# Patient Record
Sex: MALE | Race: BLACK OR AFRICAN AMERICAN
[De-identification: names, ages, dates, MRNs, and addresses within clinical notes are randomized per-mention and may not be internally consistent; named-entity substitution may affect disease eponyms.]

---

## 2020-06-06 ENCOUNTER — HOSPITAL ENCOUNTER (EMERGENCY)
Dept: HOSPITAL 96 - M.ERS | Age: 45
Discharge: HOME | End: 2020-06-06
Payer: COMMERCIAL

## 2020-06-06 VITALS — DIASTOLIC BLOOD PRESSURE: 106 MMHG | SYSTOLIC BLOOD PRESSURE: 142 MMHG

## 2020-06-06 VITALS — WEIGHT: 205.01 LBS | BODY MASS INDEX: 23.72 KG/M2 | HEIGHT: 78 IN

## 2020-06-06 DIAGNOSIS — N34.2: Primary | ICD-10-CM

## 2020-06-06 DIAGNOSIS — N30.00: ICD-10-CM

## 2020-06-06 LAB
BACTERIA-REFLEX: (no result) /HPF
BILIRUB UR-MCNC: NEGATIVE MG/DL
COLOR UR: YELLOW
KETONES UR STRIP-MCNC: (no result) MG/DL
MUCUS: (no result) STRN/LPF
PROT UR QL STRIP: NEGATIVE
RBC # UR STRIP: NEGATIVE /UL
RBC #/AREA URNS HPF: (no result) /HPF (ref 0–2)
SP GR UR STRIP: 1.02 (ref 1–1.03)
SQUAMOUS: (no result) /LPF (ref 0–3)
URINE CLARITY: CLEAR
URINE GLUCOSE-RANDOM: NEGATIVE
URINE LEUKOCYTES-REFLEX: (no result)
URINE NITRITE-REFLEX: NEGATIVE
UROBILINOGEN UR STRIP-ACNC: 0.2 E.U./DL (ref 0.2–1)

## 2020-07-01 ENCOUNTER — HOSPITAL ENCOUNTER (EMERGENCY)
Dept: HOSPITAL 96 - M.ERS | Age: 45
Discharge: HOME | End: 2020-07-01
Payer: COMMERCIAL

## 2020-07-01 VITALS — DIASTOLIC BLOOD PRESSURE: 80 MMHG | SYSTOLIC BLOOD PRESSURE: 139 MMHG

## 2020-07-01 VITALS — WEIGHT: 210.01 LBS | HEIGHT: 78 IN | BODY MASS INDEX: 24.3 KG/M2

## 2020-07-01 DIAGNOSIS — Z79.899: ICD-10-CM

## 2020-07-01 DIAGNOSIS — N20.0: Primary | ICD-10-CM

## 2020-07-01 LAB
ABSOLUTE BASOPHILS: 0 THOU/UL (ref 0–0.2)
ABSOLUTE EOSINOPHILS: 0.1 THOU/UL (ref 0–0.7)
ABSOLUTE MONOCYTES: 0.5 THOU/UL (ref 0–1.2)
ANION GAP SERPL CALC-SCNC: 1 MMOL/L (ref 7–16)
BASOPHILS NFR BLD AUTO: 0.8 %
BILIRUB UR-MCNC: NEGATIVE MG/DL
BUN SERPL-MCNC: 17 MG/DL (ref 7–18)
CALCIUM SERPL-MCNC: 8.9 MG/DL (ref 8.5–10.1)
CHLORIDE SERPL-SCNC: 100 MMOL/L (ref 98–107)
CO2 SERPL-SCNC: 31 MMOL/L (ref 21–32)
COLOR UR: YELLOW
CREAT SERPL-MCNC: 1.1 MG/DL (ref 0.6–1.3)
EOSINOPHIL NFR BLD: 1.1 %
GLUCOSE SERPL-MCNC: 91 MG/DL (ref 70–99)
GRANULOCYTES NFR BLD MANUAL: 55.2 %
HCT VFR BLD CALC: 40.9 % (ref 42–52)
HGB BLD-MCNC: 13.6 GM/DL (ref 14–18)
KETONES UR STRIP-MCNC: (no result) MG/DL
LYMPHOCYTES # BLD: 1.7 THOU/UL (ref 0.8–5.3)
LYMPHOCYTES NFR BLD AUTO: 32.4 %
MCH RBC QN AUTO: 28.2 PG (ref 26–34)
MCHC RBC AUTO-ENTMCNC: 33.3 G/DL (ref 28–37)
MCV RBC: 84.7 FL (ref 80–100)
MONOCYTES NFR BLD: 10.5 %
MPV: 10 FL. (ref 7.2–11.1)
NEUTROPHILS # BLD: 2.8 THOU/UL (ref 1.6–8.1)
NUCLEATED RBCS: 0 /100WBC
PLATELET COUNT*: 188 THOU/UL (ref 150–400)
POTASSIUM SERPL-SCNC: 4 MMOL/L (ref 3.5–5.1)
PROT UR QL STRIP: NEGATIVE
RBC # BLD AUTO: 4.83 MIL/UL (ref 4.5–6)
RBC # UR STRIP: NEGATIVE /UL
RDW-CV: 13.7 % (ref 10.5–14.5)
SODIUM SERPL-SCNC: 132 MMOL/L (ref 136–145)
SP GR UR STRIP: >= 1.03 (ref 1–1.03)
URINE CLARITY: CLEAR
URINE GLUCOSE-RANDOM: NEGATIVE
URINE LEUKOCYTES-REFLEX: NEGATIVE
URINE NITRITE-REFLEX: NEGATIVE
UROBILINOGEN UR STRIP-ACNC: 0.2 E.U./DL (ref 0.2–1)
WBC # BLD AUTO: 5.1 THOU/UL (ref 4–11)

## 2022-01-28 ENCOUNTER — HOSPITAL ENCOUNTER (EMERGENCY)
Dept: HOSPITAL 96 - M.ERS | Age: 47
Discharge: HOME | End: 2022-01-28
Payer: COMMERCIAL

## 2022-01-28 VITALS — DIASTOLIC BLOOD PRESSURE: 82 MMHG | SYSTOLIC BLOOD PRESSURE: 140 MMHG

## 2022-01-28 VITALS — HEIGHT: 78 IN | BODY MASS INDEX: 28.35 KG/M2 | WEIGHT: 245 LBS

## 2022-01-28 DIAGNOSIS — J44.9: ICD-10-CM

## 2022-01-28 DIAGNOSIS — U07.1: Primary | ICD-10-CM

## 2022-01-28 LAB
ANION GAP SERPL CALC-SCNC: 6 MMOL/L (ref 7–16)
BUN SERPL-MCNC: 14 MG/DL (ref 7–18)
CALCIUM SERPL-MCNC: 8.8 MG/DL (ref 8.5–10.1)
CHLORIDE SERPL-SCNC: 107 MMOL/L (ref 98–107)
CO2 SERPL-SCNC: 31 MMOL/L (ref 21–32)
CREAT SERPL-MCNC: 1.2 MG/DL (ref 0.6–1.3)
GLUCOSE SERPL-MCNC: 88 MG/DL (ref 70–99)
HCT VFR BLD CALC: 42.3 % (ref 42–52)
HGB BLD-MCNC: 13.7 GM/DL (ref 14–18)
MCH RBC QN AUTO: 27.1 PG (ref 26–34)
MCHC RBC AUTO-ENTMCNC: 32.3 G/DL (ref 28–37)
MCV RBC: 83.9 FL (ref 80–100)
MPV: 9.1 FL. (ref 7.2–11.1)
PLATELET COUNT*: 220 THOU/UL (ref 150–400)
POTASSIUM SERPL-SCNC: 4 MMOL/L (ref 3.5–5.1)
RBC # BLD AUTO: 5.04 MIL/UL (ref 4.5–6)
RDW-CV: 14.1 % (ref 10.5–14.5)
SODIUM SERPL-SCNC: 144 MMOL/L (ref 136–145)
WBC # BLD AUTO: 4.2 THOU/UL (ref 4–11)

## 2022-01-30 NOTE — EKG
[place] [address]
Phone:  [phone]                     ELECTROCARDIOGRAM REPORT      
_______________________________________________________________________________
 
Name:         CHRISTIAN WOODRUFF           Room:                     [facility]#:    [id number]     Account #:     [id number]  
Admission:    22    Attend Phys:                     
Discharge:    22    Date of Birth: [birth year]  
Date of Service: 22 183  Report #:      [id number]
        [id number]
_______________________________________________________________________________
THIS REPORT FOR:  //name//                      
 
                         [facility] ED
                                       
Test Date:    2022               Test Time:    18:39:00
Pat Name:     CHRISTIAN WOODRUFF        Department:   
Patient ID:   [id number]            Room:          
Gender:                               Technician:   MTRMAREK
:          1975               Requested By: Violeta Trotter
Order Number: [id number] MD:   Stone Potts
                                 Measurements
Intervals                              Axis          
Rate:         66                       P:            71
AK:           188                      QRS:          70
QRSD:         112                      T:            58
QT:           400                                    
QTc:          420                                    
                           Interpretive Statements
Sinus rhythm
Borderline intraventricular conduction delay
RSR' in V1 or V2, probably normal variant
No previous ECG available for comparison
Electronically Signed On 2022 13:12:42 CST by Stone Potts
[email or web address]
 
 
 
 
 
 
 
 
 
 
 
 
 
 
 
 
 
 
 
 
  <ELECTRONICALLY SIGNED>
                                           By: Stone Potts MD, FAC   
  22     1312
D: 22 1839   _____________________________________
T: 22 1839   Stone Potts MD, [facility]     /EPI